# Patient Record
Sex: MALE | Race: WHITE | NOT HISPANIC OR LATINO | URBAN - METROPOLITAN AREA
[De-identification: names, ages, dates, MRNs, and addresses within clinical notes are randomized per-mention and may not be internally consistent; named-entity substitution may affect disease eponyms.]

---

## 2018-08-04 ENCOUNTER — EMERGENCY (EMERGENCY)
Facility: HOSPITAL | Age: 83
LOS: 1 days | Discharge: HOME | End: 2018-08-04
Attending: EMERGENCY MEDICINE
Payer: MEDICARE

## 2018-08-04 VITALS
HEART RATE: 87 BPM | WEIGHT: 179.9 LBS | TEMPERATURE: 98 F | OXYGEN SATURATION: 96 % | SYSTOLIC BLOOD PRESSURE: 198 MMHG | HEIGHT: 68 IN | RESPIRATION RATE: 18 BRPM | DIASTOLIC BLOOD PRESSURE: 68 MMHG

## 2018-08-04 VITALS
SYSTOLIC BLOOD PRESSURE: 129 MMHG | RESPIRATION RATE: 20 BRPM | OXYGEN SATURATION: 96 % | HEART RATE: 91 BPM | DIASTOLIC BLOOD PRESSURE: 80 MMHG

## 2018-08-04 LAB
ALBUMIN SERPL ELPH-MCNC: 3.4 G/DL — SIGNIFICANT CHANGE UP (ref 3.3–5)
ALP SERPL-CCNC: 122 U/L — HIGH (ref 40–120)
ALT FLD-CCNC: 12 U/L — SIGNIFICANT CHANGE UP (ref 10–45)
ANION GAP SERPL CALC-SCNC: 11 MMOL/L — SIGNIFICANT CHANGE UP (ref 5–17)
APPEARANCE UR: ABNORMAL
APTT BLD: 31.4 SEC — SIGNIFICANT CHANGE UP (ref 27.5–37.4)
AST SERPL-CCNC: 14 U/L — SIGNIFICANT CHANGE UP (ref 10–40)
BASE EXCESS BLDV CALC-SCNC: 0.7 MMOL/L — SIGNIFICANT CHANGE UP (ref -2–2)
BASE EXCESS BLDV CALC-SCNC: 2.9 MMOL/L — HIGH (ref -2–2)
BASOPHILS # BLD AUTO: 0 K/UL — SIGNIFICANT CHANGE UP (ref 0–0.2)
BASOPHILS NFR BLD AUTO: 0.1 % — SIGNIFICANT CHANGE UP (ref 0–2)
BILIRUB SERPL-MCNC: 0.5 MG/DL — SIGNIFICANT CHANGE UP (ref 0.2–1.2)
BILIRUB UR-MCNC: NEGATIVE — SIGNIFICANT CHANGE UP
BLD GP AB SCN SERPL QL: NEGATIVE — SIGNIFICANT CHANGE UP
BUN SERPL-MCNC: 17 MG/DL — SIGNIFICANT CHANGE UP (ref 7–23)
CA-I SERPL-SCNC: 1.08 MMOL/L — LOW (ref 1.12–1.3)
CA-I SERPL-SCNC: 1.16 MMOL/L — SIGNIFICANT CHANGE UP (ref 1.12–1.3)
CALCIUM SERPL-MCNC: 8.9 MG/DL — SIGNIFICANT CHANGE UP (ref 8.4–10.5)
CHLORIDE BLDV-SCNC: 102 MMOL/L — SIGNIFICANT CHANGE UP (ref 96–108)
CHLORIDE BLDV-SCNC: 104 MMOL/L — SIGNIFICANT CHANGE UP (ref 96–108)
CHLORIDE SERPL-SCNC: 97 MMOL/L — SIGNIFICANT CHANGE UP (ref 96–108)
CO2 BLDV-SCNC: 27 MMOL/L — SIGNIFICANT CHANGE UP (ref 22–30)
CO2 BLDV-SCNC: 29 MMOL/L — SIGNIFICANT CHANGE UP (ref 22–30)
CO2 SERPL-SCNC: 24 MMOL/L — SIGNIFICANT CHANGE UP (ref 22–31)
COLOR SPEC: ABNORMAL
CREAT SERPL-MCNC: 1 MG/DL — SIGNIFICANT CHANGE UP (ref 0.5–1.3)
DIFF PNL FLD: ABNORMAL
EOSINOPHIL # BLD AUTO: 0.3 K/UL — SIGNIFICANT CHANGE UP (ref 0–0.5)
EOSINOPHIL NFR BLD AUTO: 4 % — SIGNIFICANT CHANGE UP (ref 0–6)
GAS PNL BLDV: 131 MMOL/L — LOW (ref 136–145)
GAS PNL BLDV: 134 MMOL/L — LOW (ref 136–145)
GAS PNL BLDV: SIGNIFICANT CHANGE UP
GLUCOSE BLDV-MCNC: 103 MG/DL — HIGH (ref 70–99)
GLUCOSE BLDV-MCNC: 95 MG/DL — SIGNIFICANT CHANGE UP (ref 70–99)
GLUCOSE SERPL-MCNC: 112 MG/DL — HIGH (ref 70–99)
GLUCOSE UR QL: NEGATIVE — SIGNIFICANT CHANGE UP
HCO3 BLDV-SCNC: 25 MMOL/L — SIGNIFICANT CHANGE UP (ref 21–29)
HCO3 BLDV-SCNC: 28 MMOL/L — SIGNIFICANT CHANGE UP (ref 21–29)
HCT VFR BLD CALC: 35.8 % — LOW (ref 39–50)
HCT VFR BLDA CALC: 38 % — LOW (ref 39–50)
HCT VFR BLDA CALC: 39 % — SIGNIFICANT CHANGE UP (ref 39–50)
HGB BLD CALC-MCNC: 12.4 G/DL — LOW (ref 13–17)
HGB BLD CALC-MCNC: 12.7 G/DL — LOW (ref 13–17)
HGB BLD-MCNC: 12.2 G/DL — LOW (ref 13–17)
HOROWITZ INDEX BLDV+IHG-RTO: SIGNIFICANT CHANGE UP
INR BLD: 1.23 RATIO — HIGH (ref 0.88–1.16)
KETONES UR-MCNC: NEGATIVE — SIGNIFICANT CHANGE UP
LACTATE BLDV-MCNC: 1.6 MMOL/L — SIGNIFICANT CHANGE UP (ref 0.7–2)
LACTATE BLDV-MCNC: 2.5 MMOL/L — HIGH (ref 0.7–2)
LEUKOCYTE ESTERASE UR-ACNC: ABNORMAL
LYMPHOCYTES # BLD AUTO: 0.9 K/UL — LOW (ref 1–3.3)
LYMPHOCYTES # BLD AUTO: 12.5 % — LOW (ref 13–44)
MCHC RBC-ENTMCNC: 30.2 PG — SIGNIFICANT CHANGE UP (ref 27–34)
MCHC RBC-ENTMCNC: 33.9 GM/DL — SIGNIFICANT CHANGE UP (ref 32–36)
MCV RBC AUTO: 89.1 FL — SIGNIFICANT CHANGE UP (ref 80–100)
MONOCYTES # BLD AUTO: 0.5 K/UL — SIGNIFICANT CHANGE UP (ref 0–0.9)
MONOCYTES NFR BLD AUTO: 6.8 % — SIGNIFICANT CHANGE UP (ref 2–14)
NEUTROPHILS # BLD AUTO: 5.4 K/UL — SIGNIFICANT CHANGE UP (ref 1.8–7.4)
NEUTROPHILS NFR BLD AUTO: 76.5 % — SIGNIFICANT CHANGE UP (ref 43–77)
NITRITE UR-MCNC: NEGATIVE — SIGNIFICANT CHANGE UP
OTHER CELLS CSF MANUAL: 4 ML/DL — LOW (ref 18–22)
PCO2 BLDV: 43 MMHG — SIGNIFICANT CHANGE UP (ref 35–50)
PCO2 BLDV: 45 MMHG — SIGNIFICANT CHANGE UP (ref 35–50)
PH BLDV: 7.39 — SIGNIFICANT CHANGE UP (ref 7.35–7.45)
PH BLDV: 7.4 — SIGNIFICANT CHANGE UP (ref 7.35–7.45)
PH UR: 6 — SIGNIFICANT CHANGE UP (ref 5–8)
PLATELET # BLD AUTO: 323 K/UL — SIGNIFICANT CHANGE UP (ref 150–400)
PO2 BLDV: 19 MMHG — LOW (ref 25–45)
PO2 BLDV: 35 MMHG — SIGNIFICANT CHANGE UP (ref 25–45)
POTASSIUM BLDV-SCNC: 3.4 MMOL/L — LOW (ref 3.5–5.3)
POTASSIUM BLDV-SCNC: 3.5 MMOL/L — SIGNIFICANT CHANGE UP (ref 3.5–5.3)
POTASSIUM SERPL-MCNC: 3.4 MMOL/L — LOW (ref 3.5–5.3)
POTASSIUM SERPL-SCNC: 3.4 MMOL/L — LOW (ref 3.5–5.3)
PROT SERPL-MCNC: 7.2 G/DL — SIGNIFICANT CHANGE UP (ref 6–8.3)
PROT UR-MCNC: 150 MG/DL
PROTHROM AB SERPL-ACNC: 13.5 SEC — HIGH (ref 9.8–12.7)
RBC # BLD: 4.02 M/UL — LOW (ref 4.2–5.8)
RBC # FLD: 12.2 % — SIGNIFICANT CHANGE UP (ref 10.3–14.5)
RH IG SCN BLD-IMP: POSITIVE — SIGNIFICANT CHANGE UP
SAO2 % BLDV: 23 % — LOW (ref 67–88)
SAO2 % BLDV: 61 % — LOW (ref 67–88)
SODIUM SERPL-SCNC: 132 MMOL/L — LOW (ref 135–145)
SP GR SPEC: SIGNIFICANT CHANGE UP (ref 1.01–1.02)
UROBILINOGEN FLD QL: NEGATIVE — SIGNIFICANT CHANGE UP
WBC # BLD: 7 K/UL — SIGNIFICANT CHANGE UP (ref 3.8–10.5)
WBC # FLD AUTO: 7 K/UL — SIGNIFICANT CHANGE UP (ref 3.8–10.5)

## 2018-08-04 PROCEDURE — 78226 HEPATOBILIARY SYSTEM IMAGING: CPT | Mod: 26

## 2018-08-04 PROCEDURE — 82330 ASSAY OF CALCIUM: CPT

## 2018-08-04 PROCEDURE — A9537: CPT

## 2018-08-04 PROCEDURE — 78226 HEPATOBILIARY SYSTEM IMAGING: CPT

## 2018-08-04 PROCEDURE — 83605 ASSAY OF LACTIC ACID: CPT

## 2018-08-04 PROCEDURE — 96375 TX/PRO/DX INJ NEW DRUG ADDON: CPT | Mod: XU

## 2018-08-04 PROCEDURE — 80053 COMPREHEN METABOLIC PANEL: CPT

## 2018-08-04 PROCEDURE — 87086 URINE CULTURE/COLONY COUNT: CPT

## 2018-08-04 PROCEDURE — 85027 COMPLETE CBC AUTOMATED: CPT

## 2018-08-04 PROCEDURE — 76775 US EXAM ABDO BACK WALL LIM: CPT | Mod: 26

## 2018-08-04 PROCEDURE — 83690 ASSAY OF LIPASE: CPT

## 2018-08-04 PROCEDURE — 86900 BLOOD TYPING SEROLOGIC ABO: CPT

## 2018-08-04 PROCEDURE — 74174 CTA ABD&PLVS W/CONTRAST: CPT

## 2018-08-04 PROCEDURE — 82435 ASSAY OF BLOOD CHLORIDE: CPT

## 2018-08-04 PROCEDURE — 51700 IRRIGATION OF BLADDER: CPT

## 2018-08-04 PROCEDURE — 85014 HEMATOCRIT: CPT

## 2018-08-04 PROCEDURE — 74174 CTA ABD&PLVS W/CONTRAST: CPT | Mod: 26

## 2018-08-04 PROCEDURE — 99284 EMERGENCY DEPT VISIT MOD MDM: CPT | Mod: 25

## 2018-08-04 PROCEDURE — 85610 PROTHROMBIN TIME: CPT

## 2018-08-04 PROCEDURE — 99283 EMERGENCY DEPT VISIT LOW MDM: CPT | Mod: 25

## 2018-08-04 PROCEDURE — 84295 ASSAY OF SERUM SODIUM: CPT

## 2018-08-04 PROCEDURE — 81001 URINALYSIS AUTO W/SCOPE: CPT

## 2018-08-04 PROCEDURE — 76775 US EXAM ABDO BACK WALL LIM: CPT

## 2018-08-04 PROCEDURE — 82947 ASSAY GLUCOSE BLOOD QUANT: CPT

## 2018-08-04 PROCEDURE — 85730 THROMBOPLASTIN TIME PARTIAL: CPT

## 2018-08-04 PROCEDURE — 87040 BLOOD CULTURE FOR BACTERIA: CPT

## 2018-08-04 PROCEDURE — 51702 INSERT TEMP BLADDER CATH: CPT

## 2018-08-04 PROCEDURE — 96365 THER/PROPH/DIAG IV INF INIT: CPT | Mod: XU

## 2018-08-04 PROCEDURE — 86850 RBC ANTIBODY SCREEN: CPT

## 2018-08-04 PROCEDURE — 82803 BLOOD GASES ANY COMBINATION: CPT

## 2018-08-04 PROCEDURE — 99284 EMERGENCY DEPT VISIT MOD MDM: CPT | Mod: GC,25

## 2018-08-04 PROCEDURE — 86901 BLOOD TYPING SEROLOGIC RH(D): CPT

## 2018-08-04 PROCEDURE — 99283 EMERGENCY DEPT VISIT LOW MDM: CPT

## 2018-08-04 PROCEDURE — 84132 ASSAY OF SERUM POTASSIUM: CPT

## 2018-08-04 RX ORDER — PIPERACILLIN AND TAZOBACTAM 4; .5 G/20ML; G/20ML
3.38 INJECTION, POWDER, LYOPHILIZED, FOR SOLUTION INTRAVENOUS ONCE
Qty: 0 | Refills: 0 | Status: COMPLETED | OUTPATIENT
Start: 2018-08-04 | End: 2018-08-04

## 2018-08-04 RX ORDER — ACETAMINOPHEN 500 MG
650 TABLET ORAL ONCE
Qty: 0 | Refills: 0 | Status: COMPLETED | OUTPATIENT
Start: 2018-08-04 | End: 2018-08-04

## 2018-08-04 RX ORDER — FLUCONAZOLE 150 MG/1
200 TABLET ORAL ONCE
Qty: 0 | Refills: 0 | Status: DISCONTINUED | OUTPATIENT
Start: 2018-08-04 | End: 2018-08-04

## 2018-08-04 RX ORDER — FLUCONAZOLE 150 MG/1
1 TABLET ORAL
Qty: 7 | Refills: 0 | OUTPATIENT
Start: 2018-08-04 | End: 2018-08-10

## 2018-08-04 RX ORDER — SODIUM CHLORIDE 9 MG/ML
500 INJECTION INTRAMUSCULAR; INTRAVENOUS; SUBCUTANEOUS ONCE
Qty: 0 | Refills: 0 | Status: COMPLETED | OUTPATIENT
Start: 2018-08-04 | End: 2018-08-04

## 2018-08-04 RX ORDER — POTASSIUM CHLORIDE 20 MEQ
10 PACKET (EA) ORAL ONCE
Qty: 0 | Refills: 0 | Status: COMPLETED | OUTPATIENT
Start: 2018-08-04 | End: 2018-08-04

## 2018-08-04 RX ADMIN — Medication 650 MILLIGRAM(S): at 10:13

## 2018-08-04 RX ADMIN — Medication 650 MILLIGRAM(S): at 09:43

## 2018-08-04 RX ADMIN — SODIUM CHLORIDE 500 MILLILITER(S): 9 INJECTION INTRAMUSCULAR; INTRAVENOUS; SUBCUTANEOUS at 10:30

## 2018-08-04 RX ADMIN — SODIUM CHLORIDE 500 MILLILITER(S): 9 INJECTION INTRAMUSCULAR; INTRAVENOUS; SUBCUTANEOUS at 10:27

## 2018-08-04 RX ADMIN — Medication 100 MILLIEQUIVALENT(S): at 10:27

## 2018-08-04 RX ADMIN — PIPERACILLIN AND TAZOBACTAM 200 GRAM(S): 4; .5 INJECTION, POWDER, LYOPHILIZED, FOR SOLUTION INTRAVENOUS at 15:20

## 2018-08-04 RX ADMIN — Medication 10 MILLIEQUIVALENT(S): at 10:30

## 2018-08-04 RX ADMIN — PIPERACILLIN AND TAZOBACTAM 3.38 GRAM(S): 4; .5 INJECTION, POWDER, LYOPHILIZED, FOR SOLUTION INTRAVENOUS at 15:48

## 2018-08-04 NOTE — ED PROVIDER NOTE - PLAN OF CARE
Take all medications as directed.  For pain take Acetaminophen (Tylenol) 250mg-650mg every 6-8 hours.  Follow up with your primary physician in 24-48 hours.  Return to the ER for worsening symptoms or any other concerns.

## 2018-08-04 NOTE — ED ADULT NURSE NOTE - OBJECTIVE STATEMENT
89y Male w/ PMH of prostate cancer , hard of hearing presents with a clogged mejia.  The Mejia was last emptied at 8pm last night, he has since felt the urge to urinate and had suprapubic pain.  Pt is unable to verbalize is pain level . As per wife his Mejia was last changed 10 days ago and he has history of clogged Mejia in the past. .  He has been treated for a UTI for 7 days and now has continued treatment with a lower dose of antibiotics, he has been taking the low dose of antibiotics for 1 day.  He denies any current fevers, chills, blood in the urine.

## 2018-08-04 NOTE — CONSULT NOTE ADULT - ATTENDING COMMENTS
seen and examined 08-04-18 @ 1545    visited ER today for pain from obstructed Alvarenga which has resolved with replacement  no RUQ pain, nausea or vomiting    afeb  AVSS  abd soft / NT / ND    WBC = 7  LFTs wnl    CTA abd/pelvis - gallbladder contracted and filled with stones  HIDA - nonvisualization of gallbladder most likely secondary to nonfunctional contracted gallbladder filled with stones and not acute cholecystitis    presentation not consistent with acute cholecystitis  -reconsult surgery PRN

## 2018-08-04 NOTE — ED ADULT NURSE REASSESSMENT NOTE - NS ED NURSE REASSESS COMMENT FT1
Alvarenga catheter inserted by Dr. JEAN CARLOS Seaman  because of pt's h/o prostate cancer. Bloody urine noted from catheter and pt actively moaning for pain. Pt given Tylenol as ordered; will reassess.
Surgical consult in progress by Dr. Rivas.
pt returned from nuclear meds, no complain of pain voiced at this time. Vitals signs stable; awaiting disposition.
pt left unit for nuclear med, vitals signs stable. Denies complain of pain at this time.
Pt report he has no pain at this time. Left unit for Ct. Scan .

## 2018-08-04 NOTE — ED PROVIDER NOTE - CARE PLAN
Principal Discharge DX:	UTI (urinary tract infection) due to urinary indwelling Alvarenga catheter  Assessment and plan of treatment:	Take all medications as directed.  For pain take Acetaminophen (Tylenol) 250mg-650mg every 6-8 hours.  Follow up with your primary physician in 24-48 hours.  Return to the ER for worsening symptoms or any other concerns.

## 2018-08-04 NOTE — PROCEDURE NOTE - ADDITIONAL PROCEDURE DETAILS
Bladder irrigated with NS for teaching purposes. Showed wife how to instill saline to dislodge clot and maintain mejia patency

## 2018-08-04 NOTE — CONSULT NOTE ADULT - SUBJECTIVE AND OBJECTIVE BOX
89yMale with known advancing prostate cancer who has required mejia catheter for two months that keeps getting clogged ~ twoce/ week requiring hand irrigation in the office. He arrives with suprapubic pains and no mejia output. Mejia was successfully replaced in the ER and noted to have 275 urine drained with dark urine. S/p hydration in the ER urine color is not clear yellow with sediment/ clot material/ tissue type debris in the tubing.   Recently finishing course of antibiotics for UTI.   No fevers/ chills/ nausea/ vomiting flank pains/   PAST MEDICAL & SURGICAL HISTORY:  [place] (hard of hearing)  History of prostate cancer: dx 12 years ago  Obese  Bilateral cataracts  Glaucoma, bilateral  H/O: HTN (hypertension)  history of DVT: x 2-3 years ago  Ankle fracture, left  Ankle fracture: left  H/O tooth extraction      MEDICATIONS  (STANDING):  fluconAZOLE IVPB 200 milliGRAM(s) IV Intermittent once    MEDICATIONS  (PRN):      FAMILY HISTORY:  noncontrib.     Allergies    No Known Allergies    Intolerances        SOCIAL HISTORY:   retired     REVIEW OF SYSTEMS: Otherwise negative as stated in HPI    Physical Exam  Vital signs  T(C): 36.7 (18 @ 14:30), Max: 36.9 (18 @ 08:23)  HR: 86 (18 @ 14:30)  BP: 112/68 (18 @ 14:30)  SpO2: 95% (18 @ 14:30)  Wt(kg): --    Output    UOP    Gen:  AWAKE ALERT NAD AXOX3    Pulm:  NO RESP DISTRESS  	  CV:  S1S2    GI:  SOFT NT/ND  NONPALP BLADDER     :  MEJIA IN PLACE WITH YELLOW URINE                           	      LABS:                          12.2   7.0   )-----------( 323      ( 04 Aug 2018 08:51 )             35.8       08-    132<L>  |  97  |  17  ----------------------------<  112<H>  3.4<L>   |  24  |  1.00    Ca    8.9      04 Aug 2018 08:51    TPro  7.2  /  Alb  3.4  /  TBili  0.5  /  DBili  x   /  AST  14  /  ALT  12  /  AlkPhos  122<H>  08-04    PT/INR - ( 04 Aug 2018 08:51 )   PT: 13.5 sec;   INR: 1.23 ratio         PTT - ( 04 Aug 2018 08:51 )  PTT:31.4 sec  Urinalysis Basic - ( 04 Aug 2018 09:41 )    Color: Red / Appearance: Turbid / S.019 / pH: x  Gluc: x / Ketone: Negative  / Bili: Negative / Urobili: Negative   Blood: x / Protein: 150 mg/dL / Nitrite: Negative   Leuk Esterase: Large / RBC: >50 /HPF / WBC >50 /HPF   Sq Epi: x / Non Sq Epi: x / Bacteria: x      RADIOLOGY:  < from: CT Angio Abdomen and Pelvis w/ IV Cont (18 @ 10:48) >  FINDINGS:    CTA: No aortic dissection or traumatic injury. Atherosclerotic change of   the abdominal aorta and major branch vessels. 3.8 cm infrarenal abdominal   aortic aneurysm (series 302, image 103), stable since 2016. Celiac   axis, SMA, left renal artery, 2 right renal arteries and inferior   mesenteric artery are patent and unremarkable. Patent bilateral iliac   arteries with stable 2.2 cm aneurysmal dilatation of the left common   iliac at the bifurcation (series 33, image 105). Left external and   internal iliacs are patent and unremarkable. Stable 1.6 cm ectasia of the   right internal iliac at its origin, patent, with associated thrombus.   Right external iliac artery is patent and unremarkable.    LOWER CHEST: Coronary artery atherosclerotic calcifications. Bibasilar   subsegmental atelectasis..    LIVER: Within normal limits.  BILE DUCTS: Normal caliber.  GALLBLADDER: Cholelithiasis.  SPLEEN: Within normal limits.  PANCREAS: Within normal limits.  ADRENALS: Within normal limits.  KIDNEYS/URETERS: Bilateral renal cysts and additional subcentimeter   hypodensities that are too small to catheterize. No hydronephrosis.    BLADDER: Collapsed around a Mejia catheter. Wall thickening.  REPRODUCTIVE ORGANS: Brachytherapy seeds.    BOWEL: Sigmoid diverticulosis without acute diverticulitis. No bowel wall   thickening or obstruction. 3.9 cm duodenal diverticulum. Normal appendix.  PERITONEUM: No ascites.  VESSELS: As above.  RETROPERITONEUM: No lymphadenopathy.    ABDOMINAL WALL: Small fat-containing right inguinal hernia.  BONES: Multilevel degenerative change. Chronic compression deformity of   the superior endplate of L4.    IMPRESSION:   1.  No aortic dissection.  2.  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   3.  Thickened urinary bladder wall around a Mejia catheter. Clinically   correlate for cystitis.    < end of copied text >
89yo M with hx [place], HTN, DVT/PEs (formally on Coumadin), prostate CA, now presents with obstructed mejia, also found to have gallstones on CT, +HIDA for cholecystitis. He currently denies RUQ pain, nausea, vomiting.     PMH  [place] (hard of hearing)  History of prostate cancer  Obese  Bilateral cataracts  Glaucoma, bilateral  H/O: HTN (hypertension)  history of DVT  Ankle fracture, left    PSH  Ankle fracture  H/O tooth extraction    Allergies  No Known Allergies    Physical Exam  T(C): 36.7 (18 @ 14:30), Max: 36.9 (18 @ 08:23)  HR: 91 (18 @ 17:00) (86 - 91)  BP: 129/80 (18 @ 17:00) (112/68 - 198/68)  RR: 20 (18 @ 17:00) (18 - 20)  SpO2: 96% (18 @ 17:00) (95% - 100%)  Wt(kg): --  Tmax: T(C): , Max: 36.9 (18 @ 08:23)  Wt(kg): --    Gen: NAD  HEENT: normocephalic, atraumatic, no scleral icterus  Abd: Soft, ND, NTP, no rebound, no guarding, no palpable organomegaly/masses  Ext: warm, no edema      Labs:                        12.2   7.0   )-----------( 323      ( 04 Aug 2018 08:51 )             35.8     08-    132<L>  |  97  |  17  ----------------------------<  112<H>  3.4<L>   |  24  |  1.00    Ca    8.9      04 Aug 2018 08:51    TPro  7.2  /  Alb  3.4  /  TBili  0.5  /  DBili  x   /  AST  14  /  ALT  12  /  AlkPhos  122<H>  08-04    PT/INR - ( 04 Aug 2018 08:51 )   PT: 13.5 sec;   INR: 1.23 ratio         PTT - ( 04 Aug 2018 08:51 )  PTT:31.4 sec  Urinalysis Basic - ( 04 Aug 2018 09:41 )    Color: Red / Appearance: Turbid / S.019 / pH: x  Gluc: x / Ketone: Negative  / Bili: Negative / Urobili: Negative   Blood: x / Protein: 150 mg/dL / Nitrite: Negative   Leuk Esterase: Large / RBC: >50 /HPF / WBC >50 /HPF   Sq Epi: x / Non Sq Epi: x / Bacteria: x      Imaging    < from: CT Angio Abdomen and Pelvis w/ IV Cont (18 @ 10:48) >  FINDINGS:    CTA: No aortic dissection or traumatic injury. Atherosclerotic change of   the abdominal aorta and major branch vessels. 3.8 cm infrarenal abdominal   aortic aneurysm (series 302, image 103), stable since 2016. Celiac   axis, SMA, left renal artery, 2 right renal arteries and inferior   mesenteric artery are patent and unremarkable. Patent bilateral iliac   arteries with stable 2.2 cm aneurysmal dilatation of the left common   iliac at the bifurcation (series 33, image 105). Left external and   internal iliacs are patent and unremarkable. Stable 1.6 cm ectasia of the   right internal iliac at its origin, patent, with associated thrombus.   Right external iliac artery is patent and unremarkable.    LOWER CHEST: Coronary artery atherosclerotic calcifications. Bibasilar   subsegmental atelectasis..    LIVER: Within normal limits.  BILE DUCTS: Normal caliber.  GALLBLADDER: Cholelithiasis.  SPLEEN: Within normal limits.  PANCREAS: Within normal limits.  ADRENALS: Within normal limits.  KIDNEYS/URETERS: Bilateral renal cysts and additional subcentimeter   hypodensities that are too small to catheterize. No hydronephrosis.    BLADDER: Collapsed around a Mejia catheter. Wall thickening.  REPRODUCTIVE ORGANS: Brachytherapy seeds.    BOWEL: Sigmoid diverticulosis without acute diverticulitis. No bowel wall   thickening or obstruction. 3.9 cm duodenal diverticulum. Normal appendix.  PERITONEUM: No ascites.  VESSELS: As above.  RETROPERITONEUM: No lymphadenopathy.    ABDOMINAL WALL: Small fat-containing right inguinal hernia.  BONES: Multilevel degenerative change. Chronic compression deformity of   the superior endplate of L4.    IMPRESSION:   1.  No aortic dissection.  2.  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   3.  Thickened urinary bladder wall around a Mejia catheter. Clinically   correlate for cystitis.    < end of copied text >

## 2018-08-04 NOTE — ED PROVIDER NOTE - MEDICAL DECISION MAKING DETAILS
89y Male with  prostate cancer, indwelling mejia, and recently treated for UTI presenting with a clogged mejia.  Bladder has 300ml urine and abdomen is tender to palpation in the RLQ and RUQ.  Possible biliary pathology,   Replace mejia, reassess abdomen. 89y Male with  prostate cancer, indwelling mejia, and recently treated for UTI presenting with a clogged mejia.  Bladder has 300ml urine and abdomen is tender to palpation in the RLQ and RUQ.  Possible biliary pathology,   Replace mejia, reassess abdomen.  Attending Katy Diana: 89 y/o male presenting with abdominal pain. pt with mejia that stopped draining last night. on exam pt with ttp lower abdomen and RUQ. h/o gallstones in the past. mejia changed. grossly bloody urine. h/o prostate cancer, seen by urology and given counseling and bleeding likely from known malignancy. mejia is draining. pt with ruq ttp. h/o gallstones,hida with possible acute cholecystitis and therefore seen by surgery who feel like HIDA scan positive likely from contracted gallbladder. on repeat exam abd soft. pt is on abx for possible uti. d/w pt and family observing for serial abdominal exams and pt and family prefer to go h9ome. will return for any worsening symptoms

## 2018-08-04 NOTE — ED PROVIDER NOTE - PMH
Ankle fracture, left    Bilateral cataracts    Glaucoma, bilateral    H/O: HTN (hypertension)    history of DVT  x 2-3 years ago  History of prostate cancer  dx 12 years ago  [place] (hard of hearing)    Obese

## 2018-08-04 NOTE — CONSULT NOTE ADULT - ASSESSMENT
89 YO male with prostate cancer and urinary retention presenting with clogged mejia from blood/tissue particles  rx diflucan for yeast in the urine   mejia clogging is a persistent problem for pt will therefore teach wife/ daughter how to hand irrigate for pain relief and to encourage draining   should inc po liquid intake to inc urine output 89 YO male with prostate cancer and urinary retention presenting with clogged mejia from blood/tissue particles  rx diflucan for yeast in the urine   mejia clogging is a persistent problem for pt will therefore teach wife/ daughter how to hand irrigate for pain relief and to encourage draining   should inc po liquid intake to inc urine output     Dr. Sorensen made aware and will fu with pt

## 2018-08-04 NOTE — ED PROVIDER NOTE - PHYSICAL EXAMINATION
Attending Katy Diana: Gen: NAD, heent: atrauamtic, eomi, perrla, mmm, op pink, uvula midline, neck; nttp, no nuchal rigidity, chest: nttp, no crepitus, cv: rrr,+murmur, lungs: ctab, abd: soft, ttp RUQ and rlq , +BS, no guarding, ext: wwp, neg homans, skin: no rash, neuro: awake and alert, following commands, speech clear, sensation and strength intact, no focal deficits

## 2018-08-04 NOTE — CONSULT NOTE ADULT - ASSESSMENT
89yo M with incidental finding of gallstones on CT, +HIDA likely related to his contracted gallbladder filled with stones and less likely acute cholecystitis. Nontender RUQ on exam  - No acute surgical intervention at this time  - f/u outpatient as needed  - Seen and examined with Dr. Rivas    ATP  [id number]

## 2018-08-04 NOTE — ED ADULT NURSE NOTE - INTERVENTIONS DEFINITIONS
Instruct patient to call for assistance/Provide visual clues: red socks/Reinforce activity limits and safety measures with patient and family

## 2018-08-04 NOTE — ED PROVIDER NOTE - PROGRESS NOTE DETAILS
Replaced mejia catheter, with approximately 275mm urine output with replacement mejia.  Urine was brownish red in color with clots. Replaced mejia catheter, with approximately 275mm urine output with replacement mejia.  Urine was brownish red in color with clots.  Theo Seaman, PGY-1 EM Patient reports improvement of pain after mejia replacement.  No longer tender in RUQ, RLQ on abdominal exam.  Theo Seaman, PGY-1 EM Spoke with urology resident, they will see the patient. Spoke with urology resident, they will see the patient.  Theo Seaman, PGY-1 EM HB scan shows cholecystitis, patient to be seen by surgery.  Called urology let resident know that patient was back in ED.  Theo Seaman, PGY-1 EM Urology recommended Diflucan for UA positive for budding yeast.  They will educate the patient and his wife and daughter about flushing the mejia for managing future clogs.  Theo Seaman, PGY-1 EM Surgery thought gallbladder was chronically contracted and that he did not have cholecystitis.  Theo Seaman, PGY-1 EM Attending Katy Diana: d/w surgery do not think acute cholecystitis. pt is currently on abx. recommend rclose return precautions for any worsening discomfort. pt with gross hematuria. urology to give education, as mejia draining. pt currently on abx for UTI. urine culture sent, will await sensitivities before making changes Urology recommended Diflucan (7 day course) for UA positive for budding yeast.  They will educate the patient and his wife and daughter about flushing the mejia for managing future clogs.  Theo Seaman, PGY-1 EM Urology educated patients wife and daughter on how to flush the mejia catheter, patient is ready for discharge.  Theo Seaman, PGY-1 EM

## 2018-08-05 LAB
CULTURE RESULTS: SIGNIFICANT CHANGE UP
SPECIMEN SOURCE: SIGNIFICANT CHANGE UP

## 2018-08-09 LAB
CULTURE RESULTS: SIGNIFICANT CHANGE UP
CULTURE RESULTS: SIGNIFICANT CHANGE UP
SPECIMEN SOURCE: SIGNIFICANT CHANGE UP
SPECIMEN SOURCE: SIGNIFICANT CHANGE UP

## 2018-09-24 NOTE — ED ADULT TRIAGE NOTE - TEMPERATURE IN FAHRENHEIT (DEGREES F)
September 24, 2018     Patient: Romel Stratton   YOB: 2007   Date of Visit: 9/24/2018       To Whom it May Concern:    Alley Geronimo is under my professional care  He was seen in my office on 9/24/2018  He may return to school on 09/25/2018  If you have any questions or concerns, please don't hesitate to call           Sincerely,          Nikhil Ann MD        CC: No Recipients
97.8

## 2025-06-10 NOTE — ED PROVIDER NOTE - OBJECTIVE STATEMENT
Consent: The patient's consent was obtained including but not limited to risks of crusting, scabbing, blistering, scarring, darker or lighter pigmentary change, recurrence, incomplete removal and infection.
Show Aperture Variable?: Yes
Detail Level: Simple
Render Post-Care Instructions In Note?: no
Duration Of Freeze Thaw-Cycle (Seconds): 3
Number Of Freeze-Thaw Cycles: 3 freeze-thaw cycles
Post-Care Instructions: I reviewed with the patient in detail post-care instructions. Patient is to wear sunprotection, and avoid picking at any of the treated lesions. Pt may apply Vaseline to crusted or scabbing areas.
89y Male w/ PMH of prostate cancer presents with a clogged mejia.  The mejia was last emptied at 8pm last night, he has since felt the urge to urinate and had suprapubic pain.  His mejia was last changed 10 days ago, he has had a mejia for 2 months for a weak bladder.  He has been treated for a UTI for 7 days and now has continued treatment with a lower dose of antibiotics, he has been taking the low dose of antibiotics for 1 day.  He denies any current fevers, chills, blood in the urine.